# Patient Record
Sex: MALE | Race: OTHER | HISPANIC OR LATINO | URBAN - METROPOLITAN AREA
[De-identification: names, ages, dates, MRNs, and addresses within clinical notes are randomized per-mention and may not be internally consistent; named-entity substitution may affect disease eponyms.]

---

## 2020-11-09 ENCOUNTER — EMERGENCY (EMERGENCY)
Facility: HOSPITAL | Age: 20
LOS: 1 days | Discharge: HOME | End: 2020-11-09
Attending: EMERGENCY MEDICINE
Payer: MEDICAID

## 2020-11-09 VITALS
RESPIRATION RATE: 20 BRPM | TEMPERATURE: 98 F | HEIGHT: 69 IN | DIASTOLIC BLOOD PRESSURE: 84 MMHG | WEIGHT: 171.96 LBS | OXYGEN SATURATION: 99 % | SYSTOLIC BLOOD PRESSURE: 139 MMHG | HEART RATE: 93 BPM

## 2020-11-09 LAB
APPEARANCE UR: CLEAR — SIGNIFICANT CHANGE UP
BACTERIA # UR AUTO: 0 — SIGNIFICANT CHANGE UP
BILIRUB UR-MCNC: NEGATIVE — SIGNIFICANT CHANGE UP
COLOR SPEC: SIGNIFICANT CHANGE UP
DIFF PNL FLD: NEGATIVE — SIGNIFICANT CHANGE UP
EPI CELLS # UR: 0 /HPF — SIGNIFICANT CHANGE UP
GLUCOSE UR QL: NEGATIVE — SIGNIFICANT CHANGE UP
HYALINE CASTS # UR AUTO: 0 /LPF — SIGNIFICANT CHANGE UP (ref 0–2)
KETONES UR-MCNC: NEGATIVE — SIGNIFICANT CHANGE UP
LEUKOCYTE ESTERASE UR-ACNC: NEGATIVE — SIGNIFICANT CHANGE UP
NITRITE UR-MCNC: NEGATIVE — SIGNIFICANT CHANGE UP
PH UR: 7.5 — SIGNIFICANT CHANGE UP (ref 5–8)
PROT UR-MCNC: NEGATIVE — SIGNIFICANT CHANGE UP
RBC CASTS # UR COMP ASSIST: 0 /HPF — SIGNIFICANT CHANGE UP (ref 0–4)
SP GR SPEC: 1.02 — SIGNIFICANT CHANGE UP (ref 1.01–1.02)
UROBILINOGEN FLD QL: NEGATIVE — SIGNIFICANT CHANGE UP
WBC UR QL: 0 /HPF — SIGNIFICANT CHANGE UP (ref 0–5)

## 2020-11-09 PROCEDURE — 99285 EMERGENCY DEPT VISIT HI MDM: CPT

## 2020-11-09 PROCEDURE — 87086 URINE CULTURE/COLONY COUNT: CPT

## 2020-11-09 PROCEDURE — 81001 URINALYSIS AUTO W/SCOPE: CPT

## 2020-11-09 PROCEDURE — 93975 VASCULAR STUDY: CPT

## 2020-11-09 PROCEDURE — 99284 EMERGENCY DEPT VISIT MOD MDM: CPT

## 2020-11-09 PROCEDURE — 76870 US EXAM SCROTUM: CPT

## 2020-11-09 PROCEDURE — 87491 CHLMYD TRACH DNA AMP PROBE: CPT

## 2020-11-09 NOTE — ED PROVIDER NOTE - PHYSICAL EXAMINATION
CONSTITUTIONAL: Patient is awake, alert and oriented x 3. Patient is well appearing and in no acute distress  HEAD: NCAT  NECK: supple, FROM  LUNGS: CTA B/L  HEART: RRR  ABDOMEN: Soft nd/nt  : (judit De La Cruz RN) Uncircumcised, no rashes or lesions on the penis or testicles. Quater size lesion to left superior aspect of testicle which is soft w/ ttp. Otherwise b/l testicles non-tender   EXTREMITY: no edema or calf tenderness b/l, FROM upper and lower ext b/l  SKIN: with no rash or lesions  NEURO: No focal deficits CONSTITUTIONAL: Patient is awake, alert and oriented x 3. Patient is well appearing and in no acute distress  HEAD: NCAT  NECK: supple, FROM  LUNGS: CTA B/L  HEART: RRR  ABDOMEN: Soft nd/nt  : (judit De La Cruz RN) Uncircumcised, no rashes or lesions on the penis or testicles. Quater size lesion to left superior aspect of testicle which is soft w/ ttp. Otherwise b/l testicles non-tender   EXTREMITY: no edema or calf tenderness b/l, FROM upper and lower ext b/l  SKIN: with no rash or lesions  NEURO: No focal deficits  **ATTENDING ADDENDUM (Dr. Elio Herrera): I have reviewed and substantially contributed to the elements of the PE as documented above. I have directly performed an examination of this patient in conjunction with the other members (EM resident/PA/NP) of the patient care team. I have personally reviewed the patient's vital signs at the time of the patient's initial presentation to the ED and repeatedly throughout the ED course.

## 2020-11-09 NOTE — ED ADULT NURSE NOTE - OBJECTIVE STATEMENT
Pt is a AAOx3, Finnish speaking 20y male c/o left sided groin pain and "lump" that began a few days ago. No c/o pain radiating. Pt stated pain is worse upon movement and does not request any pain medication at this time. Pt stated this is the first time he has experienced these s/s. Denies being sexually active in the past 1x year. Denies  symptoms; no c/o drainage from penis, burning upon urination or hematuria. Obvious lump noted on patient's left testicle. No s/s redness or swelling. Pt denies cp, sob, n/v, diarrhea or fevers.

## 2020-11-09 NOTE — ED PROVIDER NOTE - CHIEF COMPLAINT
The patient is a 20y Male complaining of groin pain. The patient is a 20y Male complaining of groin pain and left testicular mass/pain over the past 10 days or so.

## 2020-11-09 NOTE — ED PROVIDER NOTE - CHPI ED SYMPTOMS NEG
no burning urination/no chills/no nausea/no dysuria/no fever/no hematuria/no vomiting/no abdominal distension

## 2020-11-09 NOTE — ED PROVIDER NOTE - GENITOURINARY SCROTUM
**ATTENDING ADDENDUM (Dr. Elio Herrera): left testicular nodule v. cyst, approximately 3 cm x 2 cm on palpation. NO erythema. NO discoloration. NO soft-tissue swelling. NO laceration./MASS/EPIDIDYMAL TENDERNESS

## 2020-11-09 NOTE — ED PROVIDER NOTE - NS ED ROS FT
**ATTENDING ADDENDUM (Dr. Elio Herrera): During my interview with the patient, I have personally obtained and/or have directly verified the elements in the past medical/surgical history and other histories as noted earlier in the EMR, in conjunction with the other members (EM resident/PA/NP) of the patient care team. I have also personally obtained and/or have directly verified/reviewed the review of systems as documented below, in conjunction with the other members (EM resident/PA/NP) of the patient care team.

## 2020-11-09 NOTE — ED PROVIDER NOTE - LAB INTERPRETATION
**ATTENDING ADDENDUM (Dr. Elio Herrera): Labs reviewed and analyzed. Pertinent findings include: UA without hematuria,

## 2020-11-09 NOTE — ED PROVIDER NOTE - PENIS
**ATTENDING ADDENDUM (Dr. Elio Herrera): NO penile glans lesions, discharge, erythema, or soft-tissue swelling./uncircumcised

## 2020-11-09 NOTE — ED PROVIDER NOTE - RESPIRATORY, MLM
Breath sounds clear and equal bilaterally. **ATTENDING ADDENDUM (Dr. Elio Herrera): NO wheezing, rales, rhonchi, crackles, stridor, drooling, retractions, nasal flaring, or tripoding.

## 2020-11-09 NOTE — ED PROVIDER NOTE - OBJECTIVE STATEMENT
20 year old male with no sig pmhx presents to ED c/o left testicular pain and lump to left testicle. Pt reports 10 days ago he initially noticed pain to his left testicle which was worsened with walking and movement. He then noticed development of a "lump" on the testicle which has been persistent w/ worsened pain. Has never had anything similar in the past. Has not been sexually active in past year and denies prior STD. Denies rashes, lesions, fevers, dysuria, hematuria, back pain.       [place]  ID [id number] 20 year old male with no sig pmhx presents to ED c/o left testicular pain and lump to left testicle. Pt reports 10 days ago he initially noticed pain to his left testicle which was worsened with walking and movement. He then noticed development of a "lump" on the testicle which has been persistent w/ worsened pain. Has never had anything similar in the past. Has not been sexually active in past year and denies prior STD. Denies rashes, lesions, fevers, dysuria, hematuria, back pain. Benedict  ID [id number]  **ATTENDING ADDENDUM (Dr. Elio Herrera): I attest that I have directly examined this patient and elicited a comparable history of present illness and review of systems with my collaborating provider (NP/PA). I attest that I have made significant contributions to the documentation where necessary and as noted in the EMR.

## 2020-11-09 NOTE — ED PROVIDER NOTE - NSFOLLOWUPINSTRUCTIONS_ED_ALL_ED_FT
Thank you for visiting our Emergency Department, it has been a pleasure taking part in your healthcare.    Please follow up with your Primary Doctor in 2-3 days.    (1) Take acetaminophen for pain  (2) We have given you two antibiotics here in the ED - azithromycin (tablets) and ceftriaxone (an injection)  (3) Drink fluids  (4) Follow up with urology clinic call [phone]  (5) Return if symptoms worsen, persist, or do not resolve

## 2020-11-09 NOTE — ED PROVIDER NOTE - ABDOMINAL EXAM
no organomegaly/no pulsating masses/soft/nondistended/**ATTENDING ADDENDUM (Dr. Elio Herrera): NO guarding, rebound, or rigidity. NO pulsatile or non-pulsatile masses. NO hernias. NO obvious hepatosplenomegaly./nontender...

## 2020-11-09 NOTE — ED PROVIDER NOTE - GENITOURINARY TESTICULAR EXAM, LEFT
cremasteric reflex present/**ATTENDING ADDENDUM (Dr. Elio Herrera): POSITIVE normal left testicular lie. POSITIVE mass/cyst as noted in the left hemiscrotum. POSITIVE mild tenderness. NO elevation of the testicle. NO Phren's sign.

## 2020-11-09 NOTE — ED PROVIDER NOTE - NONTENDER LOCATION
**ATTENDING ADDENDUM (Dr. Elio Herrera): NO costovertebral angle tenderness to percussion bilaterally.

## 2020-11-09 NOTE — ED PROVIDER NOTE - PATIENT PORTAL LINK FT
negative
You can access the FollowMyHealth Patient Portal offered by [facility] by registering at the following website: [email or web address]. By joining [employer]’s FollowMyHealth portal, you will also be able to view your health information using other applications (apps) compatible with our system.

## 2020-11-09 NOTE — ED PROVIDER NOTE - AGGRAVATING FACTORS
**ATTENDING ADDENDUM (Dr. Elio Herrera): NO movement-associated, pleuritic, positional, or exertional component to the symptoms. POSITIVE reproducible with palpation.

## 2020-11-09 NOTE — ED PROVIDER NOTE - CROS ED CONS ALL NEG
negative... Home Suture Removal Text: Patient was provided instructions on removing sutures and will remove their sutures at home.  If they have any questions or difficulties they will call the office.

## 2020-11-09 NOTE — ED PROVIDER NOTE - PROGRESS NOTE DETAILS
**ATTENDING ADDENDUM (Dr. Elio Herrera): initial assessment by [facility]. Agree with goals/plan of ED care as described in EMR, including diagnostics, therapeutics and consultation as clinically warranted (reviewed with L[facility] Gallegos during initial course in [place]). Anticipatory guidance provided by ED team. Will continue to observe and monitor closely. **ATTENDING ADDENDUM (Dr. Elio Herrera): patient serially evaluated throughout ED course. NO acute deterioration up to this time in the ED. Personally reassessed patient using [employer] Interpretation services ([place]; Slovak language). ED diagnostics up to this time acknowledged, reviewed and noted. NO evidence of testicular torsion. POSITIVE minimal tenderness and soreness of the left testicle, proximate to an epididymidal cyst and associated epididymitis. Extensive anticipatory guidance provided to patient +/or family member(s). Patient instructed to followup with Urology Clinic. Antibiotics selected to align with and not predispose patient to ADRs (patient taking methotrexate and other drugs as noted for rheumatoid arthritis). Patient appears STABLE for discharge at this time. Agree with discharge home with close outpatient followup as instruction.

## 2020-11-09 NOTE — ED PROVIDER NOTE - CARE PLAN
Principal Discharge DX:	Epididymitis  Goal:	**ATTENDING ADDENDUM (Dr. Elio Herrera): Goals of care include resolution of emergent/urgent symptoms and concerns, and restoration to baseline level of homeostasis.  Secondary Diagnosis:	Epididymal cyst

## 2020-11-09 NOTE — ED PROVIDER NOTE - NSFOLLOWUPCLINICS_GEN_ALL_ED_FT
[facility]  Urology  [address] [place] [address] - [address] [place] [address]  Phone: [phone]  Fax:   Follow Up Time: 1-3 Days

## 2020-11-09 NOTE — ED PROVIDER NOTE - MUSCULOSKELETAL NEGATIVE STATEMENT, MLM
no back pain, no gout, no musculoskeletal pain, no neck pain, and no weakness. no back pain, no gout, no musculoskeletal pain, no neck pain, and no weakness. **ATTENDING ADDENDUM (Dr. Elio Herrera): POSITIVE history of rheumatoid arthritis.

## 2020-11-09 NOTE — ED PROVIDER NOTE - GENITOURINARY [+], MLM
testicular pain testicular pain **ATTENDING ADDENDUM (Dr. Elio Herrera): POSITIVE left testicular mass/cyst and pain

## 2020-11-09 NOTE — ED PROVIDER NOTE - CLINICAL SUMMARY MEDICAL DECISION MAKING FREE TEXT BOX
**ATTENDING MEDICAL DECISION MAKING/SYNTHESIS (Dr. Elio Herrera): I have reviewed the Chief Concern(s), the HPI, the ROS, and have directly performed and confirmed the findings on the Physical Examination. I have reviewed the medical decision making with all providers, as applicable. The PROBLEM REPRESENTATION at this time is: XX.   The MOST LIKELY DIAGNOSIS, and the LIST OF DIFFERENTIAL DIAGNOSES, includes (but is not limited to) the following: obstructing ureteral stone with or without pyelonephritis, other urologic disorder, serious bacterial infection or sepsis/severe sepsis e.g. urinary tract infection or pyelonephritis with or without obstructing ureteral stone, vascular etiology e.g. AAA, dissection, or equivalent, surgical etiology for pain e.g. small bowel obstruction,. large bowel obstruction, diverticulitis, perforation, peritonitis, or equivalent, electrolyte-metabolic-endocrine derangements, dehydration. The likelihood of each of these diagnoses has been appropriately considered in the context of this patient's presentation and my evaluation. PLAN: as described in EMR, including diagnostics, therapeutics and consultation as clinically warranted. I will continue to reevaluate the patient, including the results of all testing, and monitor response to therapy throughout the patient's course in the ED. The care of this patient was in support of the [place] countermeasures to COVID-19. **ATTENDING MEDICAL DECISION MAKING/SYNTHESIS (Dr. Elio Herrera): I have reviewed the Chief Concern(s), the HPI, the ROS, and have directly performed and confirmed the findings on the Physical Examination. I have reviewed the medical decision making with all providers, as applicable. The PROBLEM REPRESENTATION at this time is: 20-year-old man, sexually active (but not active within the past year), with past medical/surgical history significant for rheumatoid arthritis (NO history of sexually transmitted infection) now presenting with left testicular mass/cyst and mild tenderness over the past 10 days. NO associated hematuria, dysuria, vesicles, lesions, ulcers, or lymphadenopathy. The MOST LIKELY DIAGNOSIS, and the LIST OF DIFFERENTIAL DIAGNOSES, includes (but is not limited to) the following: cyst, mass (benign v. malignant considered), testicular torsion, torsion of the appendix testis, varicocele, hydrocele, epididymitis, sexually transmitted infection, other mimic for testicular pain e.g. obstructing ureteral stone with or without pyelonephritis, other urologic disorder, serious bacterial infection or sepsis/severe sepsis e.g. urinary tract infection or pyelonephritis with or without obstructing ureteral stone, vascular etiology e.g. AAA, dissection, or equivalent, surgical etiology for pain e.g. small bowel obstruction,. large bowel obstruction, diverticulitis, perforation, peritonitis, or equivalent, electrolyte-metabolic-endocrine derangements, dehydration. The likelihood of each of these diagnoses has been appropriately considered in the context of this patient's presentation and my evaluation. PLAN: as described in EMR, including diagnostics, therapeutics and consultation as clinically warranted. I will continue to reevaluate the patient, including the results of all testing, and monitor response to therapy throughout the patient's course in the ED. The care of this patient was in support of the [place] countermeasures to COVID-19.

## 2020-11-09 NOTE — ED PROVIDER NOTE - EYES, MLM
Clear bilaterally, pupils equal, round and reactive to light. **ATTENDING ADDENDUM (Dr. Elio Herrera): Extraocular muscle movements intact. Clear corneas bilaterally, pupils equal and round. NO scleral icterus bilaterally.

## 2020-11-09 NOTE — ED PROVIDER NOTE - RAPID ASSESSMENT
21 y/o M recently dx with arthritis presents with left scrotum pain x10 days. States pain has been worsening with swelling. Denies urinary sx, trauma, or abd pain.     **Pt seen in the waiting room via teletriage by Loree Harrell (MD)   documentation completed by Berkley Wellington. Patient to be sent to main ED for full medical evaluation. All orders placed to be followed by MD in main ED**

## 2020-11-09 NOTE — ED PROVIDER NOTE - PLAN OF CARE
**ATTENDING ADDENDUM (Dr. Elio Herrera): Goals of care include resolution of emergent/urgent symptoms and concerns, and restoration to baseline level of homeostasis.

## 2020-11-09 NOTE — ED PROVIDER NOTE - ATTENDING CONTRIBUTION TO CARE
**ATTENDING ADDENDUM (Dr. Elio Herrera): I attest that I have directly examined this patient and reviewed and formulated the diagnostic and therapeutic management plan in collaboration with the advanced practitioner (NP, PA). Please see MDM note and remainder of EMR for findings from CC, HPI, ROS, and PE. (Rosy)

## 2020-11-09 NOTE — ED PROVIDER NOTE - SKIN, MLM
Skin normal color for race, warm, dry and intact. No evidence of rash. **ATTENDING ADDENDUM (Dr. Elio Herrera): NO rashes, lesions, ulcers, vesicles, erythema, streaking, lymphangitic spread, crepitus, cellulitis, petechiae, purpurae, track marks or ecchymoses.

## 2020-11-09 NOTE — ED PROVIDER NOTE - DIAGNOSTIC INTERPRETATION
**ATTENDING ADDENDUM (Dr. Elio Herrera): Radiographs reviewed and analyzed. Pertinent findings include: US with left testicular epididymidal cyst and associated epididymitis. NO evidence of torsion.

## 2020-11-10 VITALS
SYSTOLIC BLOOD PRESSURE: 122 MMHG | RESPIRATION RATE: 16 BRPM | HEART RATE: 70 BPM | TEMPERATURE: 98 F | OXYGEN SATURATION: 100 % | DIASTOLIC BLOOD PRESSURE: 77 MMHG

## 2020-11-10 PROCEDURE — 93975 VASCULAR STUDY: CPT | Mod: 26

## 2020-11-10 PROCEDURE — 76870 US EXAM SCROTUM: CPT | Mod: 26

## 2020-11-10 RX ORDER — CEFTRIAXONE 500 MG/1
250 INJECTION, POWDER, FOR SOLUTION INTRAMUSCULAR; INTRAVENOUS ONCE
Refills: 0 | Status: DISCONTINUED | OUTPATIENT
Start: 2020-11-10 | End: 2020-11-13

## 2020-11-10 RX ORDER — AZITHROMYCIN 500 MG/1
1000 TABLET, FILM COATED ORAL ONCE
Refills: 0 | Status: DISCONTINUED | OUTPATIENT
Start: 2020-11-10 | End: 2020-11-13

## 2020-11-11 LAB
CULTURE RESULTS: NO GROWTH — SIGNIFICANT CHANGE UP
SPECIMEN SOURCE: SIGNIFICANT CHANGE UP

## 2020-11-12 LAB
C TRACH RRNA SPEC QL NAA+PROBE: SIGNIFICANT CHANGE UP
SPECIMEN SOURCE: SIGNIFICANT CHANGE UP

## 2020-11-16 ENCOUNTER — APPOINTMENT (OUTPATIENT)
Dept: UROLOGY | Facility: HOSPITAL | Age: 20
End: 2020-11-16

## 2020-11-16 ENCOUNTER — OUTPATIENT (OUTPATIENT)
Dept: OUTPATIENT SERVICES | Facility: HOSPITAL | Age: 20
LOS: 1 days | End: 2020-11-16
Payer: SELF-PAY

## 2020-11-16 VITALS
HEART RATE: 54 BPM | WEIGHT: 173 LBS | TEMPERATURE: 97.9 F | RESPIRATION RATE: 16 BRPM | SYSTOLIC BLOOD PRESSURE: 143 MMHG | DIASTOLIC BLOOD PRESSURE: 68 MMHG

## 2020-11-16 DIAGNOSIS — N50.3 CYST OF EPIDIDYMIS: ICD-10-CM

## 2020-11-16 DIAGNOSIS — R30.0 DYSURIA: ICD-10-CM

## 2020-11-16 DIAGNOSIS — Z87.39 PERSONAL HISTORY OF OTHER DISEASES OF THE MUSCULOSKELETAL SYSTEM AND CONNECTIVE TISSUE: ICD-10-CM

## 2020-11-16 PROBLEM — Z00.00 ENCOUNTER FOR PREVENTIVE HEALTH EXAMINATION: Status: ACTIVE | Noted: 2020-11-16

## 2020-11-16 PROBLEM — M06.9 RHEUMATOID ARTHRITIS, UNSPECIFIED: Chronic | Status: ACTIVE | Noted: 2020-11-10

## 2020-11-16 PROCEDURE — G0463: CPT

## 2020-11-16 RX ORDER — FOLIC ACID 1 MG/1
1 TABLET ORAL DAILY
Refills: 0 | Status: DISCONTINUED | COMMUNITY
Start: 2020-11-16 | End: 2020-11-16

## 2020-11-16 NOTE — HISTORY OF PRESENT ILLNESS
[FreeTextEntry1] : 21yo male h/o recently diagnosed RA, presenting to uro clinic c/o left scrotal pain. Pt was recently seen in ED. US showed left epididymal tail cyst measuring about 2.9cm. concern for ?epididymitis so given dose of CTZ and zithromax. Urine cx and chlamydia test both negative. Reports pain and swelling seems to have improves since ED visit. \par \par Pt denies urinary difficulty, dysuria, h/o STDs, vision changes, or hematuria.

## 2020-11-16 NOTE — ASSESSMENT
[FreeTextEntry1] : 21yo male with left epididymal head cyst \par - reassured patient\par - scrotal support\par - NSAIDs as needed\par - RTC in 6 mo or as needed to re-eval

## 2020-11-16 NOTE — REVIEW OF SYSTEMS
[Testicular Pain] : testicular pain [Fever] : no fever [Chills] : no chills [Chest Pain] : no chest pain [Shortness Of Breath] : no shortness of breath [Abdominal Pain] : no abdominal pain [Dysuria] : no dysuria [Incontinence] : no incontinence [Nocturia] : no nocturia

## 2020-11-16 NOTE — PHYSICAL EXAM
[Normal Appearance] : normal appearance [General Appearance - In No Acute Distress] : no acute distress [] : no respiratory distress [Abdomen Soft] : soft [Abdomen Tenderness] : non-tender [Costovertebral Angle Tenderness] : no ~M costovertebral angle tenderness [Urethral Meatus] : meatus normal [Oriented To Time, Place, And Person] : oriented to person, place, and time [FreeTextEntry1] : +palpable 1-2cm left epididymal cyst, mild pain to palpation.

## 2023-02-02 NOTE — ED PROVIDER NOTE - NSPTACCESSSVCSAPPTDETAILS_ED_ALL_ED_FT
[Negative] : Heme/Lymph URGENT: needs specialty urology followup care within the next 1-3 days    Also needs primary care (internal medicine or family medicine) followup within the next 1-2 weeks